# Patient Record
Sex: FEMALE | Race: BLACK OR AFRICAN AMERICAN | NOT HISPANIC OR LATINO | Employment: FULL TIME | URBAN - METROPOLITAN AREA
[De-identification: names, ages, dates, MRNs, and addresses within clinical notes are randomized per-mention and may not be internally consistent; named-entity substitution may affect disease eponyms.]

---

## 2017-12-01 ENCOUNTER — OFFICE VISIT (OUTPATIENT)
Dept: OBSTETRICS AND GYNECOLOGY | Facility: CLINIC | Age: 37
End: 2017-12-01
Payer: COMMERCIAL

## 2017-12-01 VITALS
HEIGHT: 59 IN | WEIGHT: 171.5 LBS | BODY MASS INDEX: 34.57 KG/M2 | DIASTOLIC BLOOD PRESSURE: 68 MMHG | SYSTOLIC BLOOD PRESSURE: 118 MMHG

## 2017-12-01 DIAGNOSIS — N89.8 VAGINAL DISCHARGE: ICD-10-CM

## 2017-12-01 DIAGNOSIS — Z01.419 ENCOUNTER FOR GYNECOLOGICAL EXAMINATION WITHOUT ABNORMAL FINDING: Primary | ICD-10-CM

## 2017-12-01 LAB
C TRACH DNA SPEC QL NAA+PROBE: NOT DETECTED
CANDIDA RRNA VAG QL PROBE: NEGATIVE
G VAGINALIS RRNA GENITAL QL PROBE: NEGATIVE
N GONORRHOEA DNA SPEC QL NAA+PROBE: NOT DETECTED
T VAGINALIS RRNA GENITAL QL PROBE: NEGATIVE

## 2017-12-01 PROCEDURE — 87480 CANDIDA DNA DIR PROBE: CPT

## 2017-12-01 PROCEDURE — 87660 TRICHOMONAS VAGIN DIR PROBE: CPT

## 2017-12-01 PROCEDURE — 87591 N.GONORRHOEAE DNA AMP PROB: CPT

## 2017-12-01 PROCEDURE — 99999 PR PBB SHADOW E&M-EST. PATIENT-LVL II: CPT | Mod: PBBFAC,,, | Performed by: OBSTETRICS & GYNECOLOGY

## 2017-12-01 PROCEDURE — 99395 PREV VISIT EST AGE 18-39: CPT | Mod: S$GLB,,, | Performed by: OBSTETRICS & GYNECOLOGY

## 2017-12-01 NOTE — PROGRESS NOTES
12/1/2017    Trichomonas vaginalis Negative   Gardnerella vaginalis Negative   Candida sp Negative   Chlamydia, Amplified DNA Not Detected   N gonorrhoeae, amplified DNA Not Detected       PT HERE FOR ANNUAL.  VAG D/C TX WITH MONASTAT LAST WEEK.  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    ROS:  GENERAL: No fever, chills, fatigability or weight loss.  VULVAR: No pain, no lesions and no itching.  VAGINAL: No relaxation, no itching, no discharge, no abnormal bleeding and no lesions.  ABDOMEN: No abdominal pain. Denies nausea. Denies vomiting. No diarrhea. No constipation  BREAST: Denies pain. No lumps. No discharge.  URINARY: No incontinence, no nocturia, no frequency and no dysuria.  CARDIOVASCULAR: No chest pain. No shortness of breath. No leg cramps.  NEUROLOGICAL: No headaches. No vision changes.  The remainder of the review of systems was negative.    PE:  General Appearance: overweight And Well developed. Well nourished. In no acute distress.  Vulva: Lesions: No.  Urethral Meatus: Normal size. Normal location. No lesions. No prolapse.  Urethra: No masses. No tenderness. No prolapse. No scarring.  Bladder: No masses. No tenderness.  Vagina: Mucosa NI:yes discharge no, atrophy no, cystocele no or rectocele no.  Cervix: Lesion: no  Stenotic: no Cervical motion tenderness: no  Uterus: Uterus size: 8 weeks. Support good. Uterus size: Normal  Adnexa: Masses: No Tenderness: No CDS Nodularity: No  Abdomen: overweight No masses. No tenderness.  Breasts: No bilateral masses. No bilateral discharge. No bilateral tenderness. No bilateral fibrocystic changes.  Neck: No thyroid enlargement. No thyroid masses.  Skin: Rashes: No      PROCEDURES:    PLAN:     DIAGNOSIS:  1. Encounter for gynecological examination without abnormal finding    2. Vaginal discharge        MEDICATIONS & ORDERS:  Orders Placed This Encounter    Vaginosis Screen by DNA Probe    C. trachomatis/N. gonorrhoeae by AMP DNA Cervix       Patient  was counseled today on the new ACS guidelines for cervical cytology screening as well as the current recommendations for breast cancer screening. She was counseled to follow up with her PCP for other routine health maintenance. Counseling session lasted approximately 10 minutes, and all her questions were answered.         FOLLOW-UP: With me in 12 month

## 2017-12-22 ENCOUNTER — OFFICE VISIT (OUTPATIENT)
Dept: FAMILY MEDICINE | Facility: CLINIC | Age: 37
End: 2017-12-22
Payer: COMMERCIAL

## 2017-12-22 VITALS
TEMPERATURE: 99 F | OXYGEN SATURATION: 98 % | SYSTOLIC BLOOD PRESSURE: 114 MMHG | BODY MASS INDEX: 34.22 KG/M2 | HEART RATE: 72 BPM | HEIGHT: 59 IN | WEIGHT: 169.75 LBS | DIASTOLIC BLOOD PRESSURE: 82 MMHG

## 2017-12-22 DIAGNOSIS — Z56.6 WORK-RELATED STRESS: Primary | ICD-10-CM

## 2017-12-22 DIAGNOSIS — G44.229 CHRONIC TENSION-TYPE HEADACHE, NOT INTRACTABLE: ICD-10-CM

## 2017-12-22 PROCEDURE — 99214 OFFICE O/P EST MOD 30 MIN: CPT | Mod: 25,S$GLB,, | Performed by: NURSE PRACTITIONER

## 2017-12-22 PROCEDURE — 96372 THER/PROPH/DIAG INJ SC/IM: CPT | Mod: S$GLB,,, | Performed by: FAMILY MEDICINE

## 2017-12-22 PROCEDURE — 99999 PR PBB SHADOW E&M-EST. PATIENT-LVL III: CPT | Mod: PBBFAC,,, | Performed by: NURSE PRACTITIONER

## 2017-12-22 RX ORDER — KETOROLAC TROMETHAMINE 30 MG/ML
60 INJECTION, SOLUTION INTRAMUSCULAR; INTRAVENOUS
Status: COMPLETED | OUTPATIENT
Start: 2017-12-22 | End: 2017-12-22

## 2017-12-22 RX ORDER — TRIAMCINOLONE ACETONIDE 40 MG/ML
40 INJECTION, SUSPENSION INTRA-ARTICULAR; INTRAMUSCULAR
Status: COMPLETED | OUTPATIENT
Start: 2017-12-22 | End: 2017-12-22

## 2017-12-22 RX ORDER — IBUPROFEN 800 MG/1
800 TABLET ORAL EVERY 6 HOURS PRN
Qty: 40 TABLET | Refills: 0 | Status: SHIPPED | OUTPATIENT
Start: 2017-12-22 | End: 2019-06-28 | Stop reason: ALTCHOICE

## 2017-12-22 RX ADMIN — TRIAMCINOLONE ACETONIDE 40 MG: 40 INJECTION, SUSPENSION INTRA-ARTICULAR; INTRAMUSCULAR at 09:12

## 2017-12-22 RX ADMIN — KETOROLAC TROMETHAMINE 60 MG: 30 INJECTION, SOLUTION INTRAMUSCULAR; INTRAVENOUS at 08:12

## 2017-12-22 SDOH — SOCIAL DETERMINANTS OF HEALTH (SDOH): OTHER PHYSICAL AND MENTAL STRAIN RELATED TO WORK: Z56.6

## 2017-12-22 NOTE — LETTER
December 22, 2017      Vicki Novoa   [address]  [place] [address]             [facility]  [address] [facility] [place] [address]  Phone: [phone]  Fax: [phone] Vicki Novoa    Was treated here on 12/22/2017. Please excuse Ms. Novoa from work on 12/21/2017 as well. May Return to work/school on 12/25/2017    No Restrictions        RADHA Yao

## 2017-12-22 NOTE — PATIENT INSTRUCTIONS
Tension Headache    A muscle tension headache is a very common cause of head pain. Its also called a stress headache. When some people are under stress, they tense the muscles of their shoulder, neck, and scalp without knowing it. If this tension lasts long enough, a headache can occur. A tension headache can be quite painful. It can last for hours or even days.  Home care  Follow these tips when caring for yourself at home:  · Dont drive yourself home if you were given pain medicine for your headache. Instead, have someone else drive you home. Try to sleep when you get home. You should feel much better when you wake up.  · Put heat on the back of your neck to help ease neck spasm.  · Drink only clear liquids or eat a light diet until your symptoms get better. This will help you avoid nausea or vomiting.  How to prevent headaches  · Figure out what is causing stress in your life. Learn new ways to handle your stress. Ideas include regular exercise, biofeedback, self-hypnosis, yoga, and meditation. Talk with your healthcare provider to find out more information about managing stress. Many books and digital media are also available on this subject.  · Take time out at the first sign of a tension headache, if possible. Take yourself out of the stressful situation. Find a quiet, comfortable place to sit or lie down and let yourself relax. Heat and deep massage of the tight areas in the neck and shoulders may help ease muscle spasm. You may also get relief from a medicine like ibuprofen or a prescribed muscle relaxant.  Follow-up care  Follow up with your healthcare provider, or as advised. Talk with your provider if you have frequent headaches. He or she can figure out a treatment plan. Ask if you can have medicine to take at home the next time you get a bad headache. This may keep you from having to visit the emergency department in the future. You may need to see a headache specialist (neurologist) if you continue  to have headaches.  When to seek medical advice  Call your healthcare provider right away if any of these occur:  · Your head pain gets worse during sexual intercourse or strenuous activity  · Your head pain doesnt get better within 24 hours  · You arent able to keep liquids down (repeated vomiting)  · Fever of 100.4ºF (38ºC) or higher, or as directed by your healthcare provider  · Stiff neck  · Extreme drowsiness, confusion, or fainting  · Dizziness or dizziness with spinning sensation (vertigo)  · Weakness in an arm or leg or one side of your face  · You have difficulty speaking  · Your vision changes  Date Last Reviewed: 8/1/2016 © 2000-2017 [employer]. [address] [place] [address], [place] [address]. All rights reserved. This information is not intended as a substitute for professional medical care. Always follow your healthcare professional's instructions.        Self-Care for Headaches  Most headaches aren't serious and can be relieved with self-care. But some headaches may be a sign of another health problem like eye trouble or high blood pressure. To find the best treatment, learn what kind of headaches you get. For tension headaches, self-care will usually help. To treat migraines, ask your healthcare provider for advice. It is also possible to get both tension and migraine headaches. Self-care involves relieving the pain and avoiding headache triggers if you can.    Ways to reduce pain and tension  Try these steps:  · Apply a cold compress or ice pack to the pain site.  · Drink fluids. If nausea makes it hard to drink, try sucking on ice.  · Rest. Protect yourself from bright light and loud noises.  · Calm your emotions by imagining a peaceful scene.  · Massage tight neck, shoulder, and head muscles.  · To relax muscles, soak in a hot bath or use a hot shower.  Use medicines  Aspirin or aspirin substitutes, such as ibuprofen and acetaminophen, can relieve headache. Remember: Never give  "aspirin to anyone 18 years old or younger because of the risk of developing Reye syndrome. Use pain medicines only when necessary.  Track your headaches  Keeping a headache diary can help you and your healthcare provider identify what's causing your headaches:  · Note when each headache happens.  · Identify your activities and the foods you've eaten 6 to 8 hours before the headache began.  · Look for any trends or "triggers."  Signs of tension headache  Any of the following can be signs:  · Dull pain or feeling of pressure in a tight band around your head  · Pain in your neck or shoulders  · Headache without a definite beginning or end  · Headache after an activity such as driving or working on a computer  Signs of migraine  Any of the following can be signs:  · Throbbing pain on one or both sides of your head  · Nausea or vomiting  · Extreme sensitivity to light, sound, and smells  · Bright spots, flashes, or other visual changes  · Pain or nausea so severe that you can't continue your daily activities  Call your healthcare provider   If you have any of the following symptoms, contact your healthcare provider:  · A headache that lingers after a recent injury or bump to the head.  · A fever with a stiff neck or pain when you bend your head toward your chest.  · A headache along with slurred speech, changes in your vision, or numbness or weakness in your arms or legs.  · A headache for longer than 3 days.  · Frequent headaches, especially in the morning.  · Headaches with seizures   · Seek immediate medical attention if you have a headache that you would call "the worst headache you have ever had."   Date Last Reviewed: 10/4/2015  © 6245-3343 [employer]. [address] [place] [address]. All rights reserved. This information is not intended as a substitute for professional medical care. Always follow your healthcare professional's instructions.        Manage Stress with a Healthy " Lifestyle  Managing stress is easier if you take good care of yourself. Make time for rest and recreation. Eat healthier meals. Take a walk now and then. And dont forget to treat yourself. A little down time can go a long way.    Get enough rest  When you dont get enough sleep, you may be too tired to cope with stress. Also, stress can prevent you from sleeping well or may keep you awake. If this happens to you, try reading or listening to soothing music before you go to sleep.  Make time for yourself  In todays world, there is often too much to do in too little time. It may seem hard to make time for yourself. But try to spend just a few minutes each day doing something you enjoy. This can improve the quality of your life and your mental outlook. Also, youll be more productive when handling your day-to-day duties. And youll be in a better frame of mind to cope with stress.  Eat right  Its easy to react to stress by reaching for a bag of chips or a cup of coffee. This may give you a quick boost but may later drain your energy. To keep your energy level steady, eat healthy meals and snacks at home and at work. Try not to skip meals. Stick with a low-fat diet thats rich in whole grains and fresh fruits and vegetables.  Nourish your spirit  When life is hectic, its easy to forget what your values and goals are. To help prevent this from happening, find out what is most important in your life. Ask yourself, What would I miss most if I had to start a new life alone somewhere else? My work? My family or friends? Something I love doing? Then focus on embracing your values and what you want to achieve in your life.  Stay on the move  Exercise helps burn off the negative energy of stress. Doing something active that you enjoy also helps you get away from stressful situations. Try to walk, jog, skate, swim, dance, take a fitness class, or play a team sport on most days. Or practice yoga or luanne chi, which can help  you relax.  Put some fun into your life  Some things you may enjoy doing may be listed below. If not, try some of these. Then add your own.  · Go see a movie  · Have lunch with a friend  · Learn a new sport or game  · Plan a fun trip  · Take a class on something you always wanted to learn  · Try a new hobby   Date Last Reviewed: 1/18/2017  © 2843-1921 [employer]. [address] [place] [address], [place] [address]. All rights reserved. This information is not intended as a substitute for professional medical care. Always follow your healthcare professional's instructions.        Stress Relief: A Positive Lifestyle  Learning to manage stress doesn't happen overnight. It's a process. The more you keep at it, the more you'll feel in control of daily events.     Leave plenty of time for family fun. Have a cookout or play games together. And try to share at least one meal each day.        Set limits  Trying to fit too much into a day is a major cause of stress. Setting limits will help you feel more in control. This sometimes means saying no--to people and even to things you might want to do. This can be hard at times. But knowing your priorities can help you make choices.  Know your priorities  Using your time and energy wisely is a good way to control stress. Save time for the things that matter most in your life. Ask yourself: Do I really need to do this? Do I want to do this? If you answer yes, then go ahead. But keep in mind you can also answer no.  Learn to accept support  Everybody needs support now and then. So don't feel embarrassed to ask for help when you need it. Most people are glad to lend a hand. And asking for help can open up new lines of communication and friendships.  Stress and children  Be careful not to take your stress out on your children. They may not understand why you're stressed. But they can sense your moods. Be aware that many children--especially teenagers--are under stress, too.  So plan time to talk with your kids. Ask them about school and any problems theyre having. Finally, make sure they have plenty of time to just be kids and have fun.  Be part of your community  Taking part in community or waylon-based events can offer a sense of belonging. It also helps put you in touch with active and caring people nearby. So whether its a clean-up day at a local park or taking meals to the elderly, try to reach out to friends and neighbors. Just remember: Taking time for yourself once in a while makes it easier to help others later on.   Date Last Reviewed: 1/1/2017  © 6425-4763 [employer]. [address] [place] [address], [place] [address]. All rights reserved. This information is not intended as a substitute for professional medical care. Always follow your healthcare professional's instructions.        Identifying Causes of Stress    Things that cause stress are called stressors. They can be everyday events, major life changes, or a combination of things. They can be either happy or sad events. Knowing your stressors will help you find ways to manage your stress.  Minor hassles  Daily life is filled with little annoyances. Spilled milk, lost keys, missed phone calls. These are rarely earth-shattering events. But the stress they cause can build up over time. Minor hassles also seem more painful if you're under long-term stress.  Major changes  A move, a divorce, or the loss of a loved one are major changes. They require you to adapt to a new lifestyle. You may fear an unknown future or worry about whether you'll be able to cope. Even positive events, like getting  or having a baby, can cause major stress.  Stress overload  Being pulled in many directions can be exhausting--especially when you're juggling work and family. Working late, taking kids to soccer, paying bills, and buying groceries may feel completely overwhelming. For a time, life can seem totally out of control.  Feeling  "helpless  Feeling helpless is a sign of long-term stress. You may feel like you have no control over your life. Even a faraway disaster told on the evening news may seem like part of your own troubles. Over time, these feelings may lead to depression. If you feel "down" for weeks, talk with your healthcare provider or a counselor. Depression can be treated.  Date Last Reviewed: 1/1/2017  © 4997-7374 [employer], [employer]. [address] [place] [address], [place] [address]. All rights reserved. This information is not intended as a substitute for professional medical care. Always follow your healthcare professional's instructions.        "

## 2017-12-22 NOTE — PROGRESS NOTES
Subjective:       Patient ID: Vicki Novoa is a 37 y.o. female.    Chief Complaint: Headache    37 you new to me presents for daily headaches. She is under work related stress. She will be reassigned to a new department following the holidays and hopefully this will help. She is also scheduled to see a psychiatrist at [facility] the week of Shannen.      Headache    This is a chronic problem. The current episode started more than 1 month ago (about 2-3 months). The problem occurs daily. The problem has been gradually worsening. The pain is located in the left unilateral and frontal region. The pain does not radiate. The pain quality is not similar to prior headaches. The quality of the pain is described as aching. Associated symptoms include photophobia. Pertinent negatives include no blurred vision, dizziness, eye pain, eye redness, loss of balance, scalp tenderness, seizures, tinnitus or weakness. The symptoms are aggravated by emotional stress and work. She has tried acetaminophen for the symptoms.       History reviewed. No pertinent past medical history.    Social History     Social History    Marital status: Single     Spouse name: N/A    Number of children: N/A    Years of education: N/A     Occupational History    Not on file.     Social History Main Topics    Smoking status: Never Smoker    Smokeless tobacco: Not on file    Alcohol use No    Drug use: No    Sexual activity: Yes     Partners: Male     Birth control/ protection: None, OCP     Other Topics Concern    Not on file     Social History Narrative    ,  .  Exercises.  Non smoker.          History reviewed. No pertinent surgical history.    Review of Systems   HENT: Negative for tinnitus.    Eyes: Positive for photophobia. Negative for blurred vision, pain, redness and visual disturbance.   Respiratory: Negative.    Cardiovascular: Negative.    Neurological: Positive for headaches. Negative for dizziness, seizures,  "syncope, facial asymmetry, weakness, light-headedness and loss of balance.   Psychiatric/Behavioral: Positive for behavioral problems (crying). Negative for agitation, decreased concentration, self-injury and suicidal ideas. The patient is not nervous/anxious.    All other systems reviewed and are negative.      Objective:   /82 (BP Location: Left arm, Patient Position: Sitting, BP Method: Small (Manual))   Pulse 72   Temp 98.5 °F (36.9 °C) (Oral)   Ht 4' 11" (1.499 m)   Wt 77 kg (169 lb 12.1 oz)   SpO2 98%   BMI 34.29 kg/m²      Physical Exam   Constitutional: She is oriented to person, place, and time. She appears well-developed and well-nourished. She is cooperative. No distress.   HENT:   Head: Normocephalic and atraumatic.   Eyes: EOM are normal. Pupils are equal, round, and reactive to light.   Cardiovascular: Normal rate, regular rhythm and normal heart sounds.    Pulmonary/Chest: Effort normal and breath sounds normal. No respiratory distress. She has no decreased breath sounds. She has no wheezes. She has no rhonchi. She has no rales.   Neurological: She is alert and oriented to person, place, and time. She has normal strength. No sensory deficit.   Skin: She is not diaphoretic. No pallor.   Psychiatric: Her speech is normal. She exhibits a depressed mood.   + crying on and off       Assessment:       1. Work-related stress    2. Chronic tension-type headache, not intractable        Plan:       Vicki was seen today for headache.    Diagnoses and all orders for this visit:    Work-related stress  She will be changing departments. This will hopefully help with stress. She also has an appt with psychiatry the week of nichole.    Chronic tension-type headache, not intractable  -     ketorolac injection 60 mg; Inject 2 mLs (60 mg total) into the muscle one time.  -     triamcinolone acetonide injection 40 mg; Inject 1 mL (40 mg total) into the muscle one time.  -     ibuprofen (ADVIL,MOTRIN) 800 " MG tablet; Take 1 tablet (800 mg total) by mouth every 6 (six) hours as needed for Pain.  · Figure out what is causing stress in your life. Learn new ways to handle your stress. Ideas include regular exercise, biofeedback, self-hypnosis, yoga, and meditation. Talk with your healthcare provider to find out more information about managing stress. Many books and digital media are also available on this subject.  Take time out at the first sign of a tension headache, if possible. Take yourself out of the stressful situation. Find a quiet, comfortable place to sit or lie down and let yourself relax. Heat and deep massage of the tight areas in the neck and shoulders may help ease muscle spasm. You may also get relief from a medicine like ibuprofen or a prescribed muscle relaxant.  Return if symptoms worsen or fail to improve.

## 2018-01-02 ENCOUNTER — TELEPHONE (OUTPATIENT)
Dept: FAMILY MEDICINE | Facility: CLINIC | Age: 38
End: 2018-01-02

## 2018-01-02 NOTE — TELEPHONE ENCOUNTER
Incoming call from patient to the clinic.  Call transferred by phone room staff.  Reports 10 lb piece of wood fell on head and left shoulder at approximately 2 pm today.  Denies LOC.  Reports left sided headed and bilateral shoulder pain.  Denies blurred vision or double vision.  Denies nausea or vomiting.  Speech clear.  Denies difficulty with coordination or balance.  Has taken one tablet of OTC Ibuprofen.  Patient does not know the dose of the medication.  Reports some resolution of headache after taking medication one hour ago.  Informed patient to seek emergent evaluation if symptoms or condition worsens or does not improve.  Requesting provider appointment.  Patient aware PCP has retired and will need to select a new PCP.  Appointment scheduled for tomorrow.

## 2018-01-03 ENCOUNTER — HOSPITAL ENCOUNTER (OUTPATIENT)
Dept: RADIOLOGY | Facility: HOSPITAL | Age: 38
Discharge: HOME | End: 2018-01-03
Attending: NURSE PRACTITIONER
Payer: COMMERCIAL

## 2018-01-03 ENCOUNTER — OFFICE VISIT (OUTPATIENT)
Dept: FAMILY MEDICINE | Facility: CLINIC | Age: 38
End: 2018-01-03
Payer: COMMERCIAL

## 2018-01-03 VITALS
OXYGEN SATURATION: 98 % | HEIGHT: 59 IN | BODY MASS INDEX: 34.33 KG/M2 | DIASTOLIC BLOOD PRESSURE: 64 MMHG | SYSTOLIC BLOOD PRESSURE: 110 MMHG | TEMPERATURE: 98 F | WEIGHT: 170.31 LBS | HEART RATE: 77 BPM
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
## 2018-01-03 DIAGNOSIS — S09.90XA INJURY OF HEAD, INITIAL ENCOUNTER: ICD-10-CM

## 2018-01-03 DIAGNOSIS — S16.1XXA STRAIN OF NECK MUSCLE, INITIAL ENCOUNTER: ICD-10-CM

## 2018-01-03 DIAGNOSIS — M25.512 ACUTE PAIN OF LEFT SHOULDER: ICD-10-CM
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
## 2018-01-03 PROCEDURE — 99214 OFFICE O/P EST MOD 30 MIN: CPT | Mod: S$GLB,,, | Performed by: NURSE PRACTITIONER

## 2018-01-03 PROCEDURE — 73030 X-RAY EXAM OF SHOULDER: CPT | Mod: 26,FY,LT, | Performed by: RADIOLOGY

## 2018-01-03 PROCEDURE — 99999 PR PBB SHADOW E&M-EST. PATIENT-LVL V: CPT | Mod: PBBFAC,,, | Performed by: NURSE PRACTITIONER

## 2018-01-03 PROCEDURE — 73030 X-RAY EXAM OF SHOULDER: CPT | Mod: TC,FY,PO,LT

## 2018-01-03 RX ORDER — CYCLOBENZAPRINE HCL 10 MG
10 TABLET ORAL 3 TIMES DAILY PRN
Qty: 30 TABLET | Refills: 0 | Status: SHIPPED | OUTPATIENT
Start: 2018-01-03 | End: 2018-01-13

## 2018-01-03 RX ORDER — ETODOLAC 400 MG/1
400 TABLET, FILM COATED ORAL 3 TIMES DAILY
Qty: 30 TABLET | Refills: 0 | Status: SHIPPED | OUTPATIENT
Start: 2018-01-03 | End: 2018-01-13

## 2018-01-03 RX ORDER — ESCITALOPRAM OXALATE 10 MG/1
TABLET ORAL
Refills: 0 | COMMUNITY
Start: 2017-12-26 | End: 2019-06-25

## 2018-01-03 NOTE — LETTER
January 3, 2018      Fabiankarlos Novoa   [address]  [place] [address]             [facility]  [address] [facility] [place] [address]  Phone: [phone]  Fax: [phone] Vicki Novoa    Was treated here on 01/03/2018    May Return to work/school on 01/08/2018    No Restrictions              Sugey Coffman NP

## 2018-01-03 NOTE — TELEPHONE ENCOUNTER
Called and spoken to patient. Per Sugey Coffman NP, patient's x-ray of shoulder came back normal. Patient also stated that she r/s her CT appt on tomorrow. Patient was advised.

## 2018-01-03 NOTE — TELEPHONE ENCOUNTER
----- Message from Ila Luz sent at 1/3/2018  2:23 PM CST -----  Contact: self  Patient is calling regarding having the CY scan done. Patient can be reached at [phone].        thanks

## 2018-01-03 NOTE — PATIENT INSTRUCTIONS
Understanding Cervical Strain    There are 7 bones (vertebrae) in the neck that are part of the spine. These are called the cervical spine. Cervical strain is a medical term for neck pain. The neck has several layers of muscles. These are connected with tendons to the cervical spine and other bones. Neck pain is often the result of injury to these muscles and tendons.  Causes of cervical strain  Different types of stress on the neck can damage muscles and tendons (soft tissues) and cause cervical strain. Cervical tissues can be damaged by:  · The neck being forced past its normal range of motion, such as in a car accident or sports injury  · Constant, low-level stress, such as from poor posture or a poorly set-up workspace  Symptoms of cervical strain  These may include:  · Neck pain or stiffness  · Pain in the shoulders or upper back  · Muscle spasms  · Headache, often starting at the base of the neck  · Irritability, difficulty concentrating, or sleeplessness  Treatment for cervical strain  This problem often gets better on its own. Treatments aim to reduce pain and inflammation and increase the range of motion of the neck. Possible treatments include:  · Over-the-counter or prescription pain medicine. These help relieve pain and inflammation.  · Stretching exercises to decrease neck stiffness.  · Massage to decrease neck stiffness.  · Cold or heat pack. These help reduce pain and swelling.  Call 911  Call emergency services right away if you have any of these:  · Face drooping or numbness  · Numbness or weakness, especially in the arms or on one side  · Slurred speech or difficulty speaking  · Blurred vision   When to call your healthcare provider  Call your healthcare provider right away if you have any of these:  · Fever of 100.4°F (38°C) or higher, or as directed  · Pain or stiffness that gets worse  · Symptoms that dont get better, or get worse  · Numbness, tingling, weakness or shooting pains into the  arms or legs  · New symptoms  Date Last Reviewed: 3/10/2016  © 0070-9271 [employer]. [address] [place] [address], [place] [address]. All rights reserved. This information is not intended as a substitute for professional medical care. Always follow your healthcare professional's instructions.        After a Concussion     Awaken to check alertness as often as the health care provider suggests.     If you or someone close to you has had a mild concussion (a head injury), watch closely for signs of problems during the first 48 hours after the injury. Follow the doctors advice about recovering at home. Use the tips on this handout as a guide.  Call 911 or your emergency number if the person with the concussion will not fully wake up or has seizures or convulsions.   The first 48 hours  Dont take medicine unless approved by your healthcare provider. Try placing a cold, damp cloth on the head to help relieve a headache.  · Ask the doctor before using any medicines.  · Don't drink alcohol or take sedatives or medicines that make you sleepy.  · Don't return to sports or any activity that could cause you to hit your head until all symptoms are gone and you have been cleared by your doctor. A second head injury before fully recovering from the first one can lead to serious brain injury.  · Avoid doing activities that require a lot of concentration or a lot of attention. This will allow your brain to rest and heal more quickly.  · Return to regular physical and mental activity as directed and approved by your healthcare provider.  Tips about sleeping  For the first day or two, it may be best not to sleep for long periods of time without being checked for alertness. Follow the doctors instructions.  ? Wake every ____ hours for the next ____ hours. Ask questions to check for alertness.  ? OK to sleep through the night.  Note: A person should not be left alone after a concussion. If no adult can stay with the injured  person, let the doctor know.  When to call the doctor  If you notice any of the following, call the doctor or healthcare provider:  · Vomiting (some vomiting is common, but tell the doctor about any vomiting)  · Clear or bloody drainage from the nose or ear  · Constant drowsiness or difficulty in waking up  · Confusion or memory loss  · Blurred vision or any vision changes  · Inability to walk or talk normally  · Increased weakness or problems with coordination  · Constant, unrelieved headache that becomes more severe  · Changes in behavior or personality  · High-pitched crying in infants   Date Last Reviewed: 8/17/2015  © 9122-9436 [employer]. [address] [place] [address], [place] [address]. All rights reserved. This information is not intended as a substitute for professional medical care. Always follow your healthcare professional's instructions.

## 2018-01-03 NOTE — PROGRESS NOTES
"History of Present Illness   Vicki Novoa is a 37 y.o. woman who denies significant past medical history who presents today for evaluation after fall/head injury that occurred yesterday. She was getting firewood down from the attic and a piece fell and struck her on the left side of her head/neck. She did fall, and she is unsure of LOC. States she did feel "fuzzy" for a few seconds after injury but remembers the incident. Since the fall, she has had worsening left shoulder and left neck pain. Pain is worse with movement of left arm. She does have some associated tingling and decreased strength in left arm. She has had intermittent cervical paraspinal muscle spasm. She denies nausea, vomiting, confusion, slurred speech, blurred vision, or other symptoms since injury. She has tried 800mg Ibuprofen with no relief. She was instructed to be evaluated in ED yesterday but preferred an appointment today. She has no additional complaints and is otherwise healthy on today's visit.    History reviewed. No pertinent past medical history.    History reviewed. No pertinent surgical history.    Social History     Social History    Marital status: Single     Spouse name: N/A    Number of children: N/A    Years of education: N/A     Social History Main Topics    Smoking status: Never Smoker    Smokeless tobacco: None    Alcohol use No    Drug use: No    Sexual activity: Yes     Partners: Male     Birth control/ protection: None, OCP     Other Topics Concern    None     Social History Narrative    ,  .  Exercises.  Non smoker.          Family History   Problem Relation Age of Onset    Diabetes Father     Diabetes Maternal Grandmother     Breast cancer Neg Hx     Colon cancer Neg Hx     Ovarian cancer Neg Hx     Heart disease Neg Hx        Review of Systems  Review of Systems   Eyes: Negative for blurred vision.   Gastrointestinal: Negative for nausea and vomiting.   Musculoskeletal: Positive for falls, " "joint pain and neck pain. Negative for back pain.   Skin:        Negative for bruising or abrasions   Neurological: Positive for tingling, loss of consciousness (possible) and headaches. Negative for dizziness, speech change and focal weakness.     A complete review of systems was otherwise negative.    Physical Exam  /64 (BP Location: Right arm, Patient Position: Sitting, BP Method: Medium (Manual))   Pulse 77   Temp 98.2 °F (36.8 °C) (Oral)   Ht 4' 11" (1.499 m)   Wt 77.3 kg (170 lb 4.9 oz)   LMP 12/19/2017   SpO2 98%   BMI 34.40 kg/m²   General appearance: alert, appears stated age, cooperative and no distress  Head: small hematoma noted to left temporal region  Eyes: negative findings: pupils equal, round, reactive to light and accomodation  Neck: supple, symmetrical, trachea midline and left cervical paraspinal TTP, no bony tenderness  Back: symmetric, no curvature. ROM normal. No CVA tenderness.  Lungs: clear to auscultation bilaterally  Heart: regular rate and rhythm, S1, S2 normal, no murmur, click, rub or gallop  Extremities: extremities normal, atraumatic, no cyanosis or edema  Pulses: 2+ and symmetric  Skin: Skin color, texture, turgor normal. No rashes or lesions  Neurologic: Mental status: Alert, oriented, thought content appropriate  Sensory: normal  Motor:decreased LUE  normal RUE  normal LLE  normal RLE  Musculoskeletal: She exhibits TTP of left AC joint. There is no obvious deformity or joint crepitus. She has pain with ROM, elevation of left arm >30 degrees.    Assessment/Plan  Fall, initial encounter  X-ray of shoulder WNL no dislocation or fracture.  Given possible LOC, will obtain CT of head.  Flexeril for muscle spasm with Lodine for pain management.  Sling provided for left arm comfort.  Rest, heat to area, and avoid heavy lifting/strenuous activity.  ER precautions discussed at length with patient.  RTC PRN for persistent or worsening symptoms.  -     Cancel: X-Ray Shoulder " Trauma 3 view Left; Future; Expected date: 01/03/2018  -     cyclobenzaprine (FLEXERIL) 10 MG tablet; Take 1 tablet (10 mg total) by mouth 3 (three) times daily as needed for Muscle spasms.  Dispense: 30 tablet; Refill: 0  -     etodolac (LODINE) 400 MG tablet; Take 1 tablet (400 mg total) by mouth 3 (three) times daily.  Dispense: 30 tablet; Refill: 0  -     CT Head Without Contrast; Future; Expected date: 01/03/2018    Injury of head, initial encounter  As above.  -     Cancel: X-Ray Shoulder Trauma 3 view Left; Future; Expected date: 01/03/2018  -     Cancel: CT Head Without Contrast; Future; Expected date: 01/03/2018  -     cyclobenzaprine (FLEXERIL) 10 MG tablet; Take 1 tablet (10 mg total) by mouth 3 (three) times daily as needed for Muscle spasms.  Dispense: 30 tablet; Refill: 0  -     etodolac (LODINE) 400 MG tablet; Take 1 tablet (400 mg total) by mouth 3 (three) times daily.  Dispense: 30 tablet; Refill: 0  -     CT Head Without Contrast; Future; Expected date: 01/03/2018    Acute pain of left shoulder  As above.  -     Cancel: X-Ray Shoulder Trauma 3 view Left; Future; Expected date: 01/03/2018  -     Cancel: CT Head Without Contrast; Future; Expected date: 01/03/2018  -     cyclobenzaprine (FLEXERIL) 10 MG tablet; Take 1 tablet (10 mg total) by mouth 3 (three) times daily as needed for Muscle spasms.  Dispense: 30 tablet; Refill: 0  -     etodolac (LODINE) 400 MG tablet; Take 1 tablet (400 mg total) by mouth 3 (three) times daily.  Dispense: 30 tablet; Refill: 0    Strain of neck muscle, initial encounter  As above.  -     Cancel: X-Ray Shoulder Trauma 3 view Left; Future; Expected date: 01/03/2018  -     Cancel: CT Head Without Contrast; Future; Expected date: 01/03/2018  -     cyclobenzaprine (FLEXERIL) 10 MG tablet; Take 1 tablet (10 mg total) by mouth 3 (three) times daily as needed for Muscle spasms.  Dispense: 30 tablet; Refill: 0  -     etodolac (LODINE) 400 MG tablet; Take 1 tablet (400 mg total)  by mouth 3 (three) times daily.  Dispense: 30 tablet; Refill: 0  -     CT Head Without Contrast; Future; Expected date: 01/03/2018    She has verbalized understanding and is in agreement with plan of care.    Return if symptoms worsen or fail to improve.

## 2018-01-04 ENCOUNTER — HOSPITAL ENCOUNTER (OUTPATIENT)
Dept: RADIOLOGY | Facility: HOSPITAL | Age: 38
Discharge: HOME | End: 2018-01-04
Attending: NURSE PRACTITIONER
Payer: COMMERCIAL

## 2018-01-04 ENCOUNTER — TELEPHONE (OUTPATIENT)
Dept: FAMILY MEDICINE | Facility: CLINIC | Age: 38
End: 2018-01-04
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
## 2018-01-04 DIAGNOSIS — S09.90XA INJURY OF HEAD, INITIAL ENCOUNTER: ICD-10-CM

## 2018-01-04 DIAGNOSIS — S16.1XXA STRAIN OF NECK MUSCLE, INITIAL ENCOUNTER: ICD-10-CM

## 2018-01-04 DIAGNOSIS — S09.90XA INJURY OF HEAD, INITIAL ENCOUNTER: Primary | ICD-10-CM

## 2018-01-04 PROCEDURE — 70450 CT HEAD/BRAIN W/O DYE: CPT | Mod: TC

## 2018-01-04 PROCEDURE — 70450 CT HEAD/BRAIN W/O DYE: CPT | Mod: 26,,, | Performed by: RADIOLOGY

## 2018-01-08 ENCOUNTER — TELEPHONE (OUTPATIENT)
Dept: FAMILY MEDICINE | Facility: CLINIC | Age: 38
End: 2018-01-08

## 2018-01-08 NOTE — TELEPHONE ENCOUNTER
Called and spoken to patient. Patient was advised of message. Patient stated that she is still having left side weakness and muscle spasms. Patient does have an appt with Orthopedic on this Friday. Patient would like to know if Sugey can extend her work note til Friday?
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
## 2018-01-08 NOTE — LETTER
January 8, 2018      Vicki Novoa   [address]  [place] [address]             [facility]  [address] [place] [address]  Phone: [phone]  Fax: [phone] Vicki Novoa    Was treated here on 01/03/2018    May Return to work/school on 01/12/2018    No Restrictions              Sugey Coffman NP

## 2018-01-08 NOTE — TELEPHONE ENCOUNTER
The patient's CT scan showed no abnormalities. What symptoms is she still having? If her pain is severe, she may need to be evaluated at urgent care or ER.    Thanks!  WILFREDO Mendez

## 2018-01-20 PROBLEM — M75.51 BILATERAL SHOULDER BURSITIS: Status: ACTIVE | Noted: 2018-01-20

## 2018-01-20 PROBLEM — M47.812 CERVICAL SPONDYLOSIS: Status: ACTIVE | Noted: 2018-01-20

## 2018-01-20 PROBLEM — S29.019A STRAIN OF THORACIC REGION: Status: ACTIVE | Noted: 2018-01-20

## 2018-01-20 PROBLEM — M75.52 BILATERAL SHOULDER BURSITIS: Status: ACTIVE | Noted: 2018-01-20

## 2018-01-20 PROBLEM — S40.012A CONTUSION OF LEFT SHOULDER: Status: ACTIVE | Noted: 2018-01-20

## 2018-01-20 PROBLEM — M50.30 DDD (DEGENERATIVE DISC DISEASE), CERVICAL: Status: ACTIVE | Noted: 2018-01-20

## 2018-01-20 PROBLEM — G56.02 LEFT CARPAL TUNNEL SYNDROME: Status: ACTIVE | Noted: 2018-01-20

## 2018-02-17 PROBLEM — M50.20 HERNIATED CERVICAL DISC: Status: ACTIVE | Noted: 2018-02-17

## 2018-02-17 PROBLEM — M51.26 LUMBAR HERNIATED DISC: Status: ACTIVE | Noted: 2018-02-17

## 2018-02-17 PROBLEM — M48.02 CERVICAL STENOSIS OF SPINAL CANAL: Status: ACTIVE | Noted: 2018-02-17

## 2018-02-17 PROBLEM — M54.12 CERVICAL RADICULOPATHY: Status: ACTIVE | Noted: 2018-02-17

## 2018-02-17 PROBLEM — M48.061 LUMBAR SPINAL STENOSIS: Status: ACTIVE | Noted: 2018-02-17

## 2018-04-17 ENCOUNTER — OFFICE VISIT (OUTPATIENT)
Dept: OBSTETRICS AND GYNECOLOGY | Facility: CLINIC | Age: 38
End: 2018-04-17
Payer: COMMERCIAL

## 2018-04-17 VITALS
DIASTOLIC BLOOD PRESSURE: 64 MMHG | SYSTOLIC BLOOD PRESSURE: 100 MMHG | HEIGHT: 64 IN | WEIGHT: 165.38 LBS | BODY MASS INDEX: 28.24 KG/M2

## 2018-04-17 DIAGNOSIS — N76.0 BACTERIAL VAGINITIS: Primary | ICD-10-CM

## 2018-04-17 DIAGNOSIS — B96.89 BACTERIAL VAGINITIS: Primary | ICD-10-CM

## 2018-04-17 LAB
CANDIDA RRNA VAG QL PROBE: NEGATIVE
G VAGINALIS RRNA GENITAL QL PROBE: NEGATIVE
T VAGINALIS RRNA GENITAL QL PROBE: NEGATIVE

## 2018-04-17 PROCEDURE — 99213 OFFICE O/P EST LOW 20 MIN: CPT | Mod: S$GLB,,, | Performed by: OBSTETRICS & GYNECOLOGY

## 2018-04-17 PROCEDURE — 99999 PR PBB SHADOW E&M-EST. PATIENT-LVL II: CPT | Mod: PBBFAC,,, | Performed by: OBSTETRICS & GYNECOLOGY

## 2018-04-17 PROCEDURE — 87510 GARDNER VAG DNA DIR PROBE: CPT

## 2018-04-17 PROCEDURE — 87480 CANDIDA DNA DIR PROBE: CPT

## 2018-04-17 RX ORDER — TINIDAZOLE 500 MG/1
TABLET ORAL
Qty: 8 TABLET | Refills: 1 | Status: SHIPPED | OUTPATIENT
Start: 2018-04-17

## 2018-04-17 NOTE — PROGRESS NOTES
4/17/2018    Trichomonas vaginalis Negative   Gardnerella vaginalis Negative   Candida sp Negative       PT HERE FOR VAGINAL FISH ODOR FOR 1 WEEK. NO REAL D/C. NOT DISCOLORED.  HAS TRIED NO MEDS.    ROS:  GENERAL: No fever, chills, fatigability or weight loss.  VULVAR: No pain, no lesions and no itching.  VAGINAL: No relaxation, no itching, no discharge, no abnormal bleeding and no lesions.  ABDOMEN: No abdominal pain. Denies nausea. Denies vomiting. No diarrhea. No constipation  BREAST: Denies pain. No lumps. No discharge.  URINARY: No incontinence, no nocturia, no frequency and no dysuria.  CARDIOVASCULAR: No chest pain. No shortness of breath. No leg cramps.  NEUROLOGICAL: No headaches. No vision changes.  The remainder of the review of systems was negative.    PE:  General Appearance: overweight And Well developed. Well nourished. In no acute distress.  Vulva: Lesions: No.  Urethral Meatus: Normal size. Normal location. No lesions. No prolapse.  Urethra: No masses. No tenderness. No prolapse. No scarring.  Bladder: No masses. No tenderness.  Vagina: Mucosa NI:yes discharge yes  GREY, atrophy no, cystocele no or rectocele no.  Cervix: Lesion: no  Stenotic: no Cervical motion tenderness: no      PROCEDURES:    PLAN:     DIAGNOSIS:  1. Bacterial vaginitis        MEDICATIONS & ORDERS:  Orders Placed This Encounter    Vaginosis Screen by DNA Probe    tinidazole (TINDAMAX) 500 MG tablet     FOLLOW-UP: With me in PRN month

## 2019-06-25 ENCOUNTER — OFFICE VISIT (OUTPATIENT)
Dept: URGENT CARE | Facility: CLINIC | Age: 39
End: 2019-06-25
Payer: OTHER MISCELLANEOUS

## 2019-06-25 VITALS
RESPIRATION RATE: 16 BRPM | HEIGHT: 64 IN | DIASTOLIC BLOOD PRESSURE: 65 MMHG | WEIGHT: 165 LBS | OXYGEN SATURATION: 99 % | SYSTOLIC BLOOD PRESSURE: 113 MMHG | TEMPERATURE: 98 F | HEART RATE: 73 BPM | BODY MASS INDEX: 28.17 KG/M2
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
## 2019-06-25 DIAGNOSIS — R25.1 OCCASIONAL TREMORS: ICD-10-CM

## 2019-06-25 DIAGNOSIS — F43.0 ANXIETY IN ACUTE STRESS REACTION: Primary | ICD-10-CM

## 2019-06-25 DIAGNOSIS — R07.9 CHEST PAIN, UNSPECIFIED TYPE: ICD-10-CM

## 2019-06-25 DIAGNOSIS — F41.1 ANXIETY IN ACUTE STRESS REACTION: Primary | ICD-10-CM

## 2019-06-25 PROCEDURE — 93005 EKG 12-LEAD: ICD-10-PCS | Mod: S$GLB,,, | Performed by: PHYSICIAN ASSISTANT

## 2019-06-25 PROCEDURE — 99203 OFFICE O/P NEW LOW 30 MIN: CPT | Mod: S$GLB,,, | Performed by: PHYSICIAN ASSISTANT

## 2019-06-25 PROCEDURE — 93010 EKG 12-LEAD: ICD-10-PCS | Mod: S$GLB,,, | Performed by: INTERNAL MEDICINE

## 2019-06-25 PROCEDURE — 99203 PR OFFICE/OUTPT VISIT, NEW, LEVL III, 30-44 MIN: ICD-10-PCS | Mod: S$GLB,,, | Performed by: PHYSICIAN ASSISTANT

## 2019-06-25 PROCEDURE — 93005 ELECTROCARDIOGRAM TRACING: CPT | Mod: S$GLB,,, | Performed by: PHYSICIAN ASSISTANT

## 2019-06-25 PROCEDURE — 93010 ELECTROCARDIOGRAM REPORT: CPT | Mod: S$GLB,,, | Performed by: INTERNAL MEDICINE

## 2019-06-25 RX ORDER — AMITRIPTYLINE HYDROCHLORIDE 25 MG/1
25 TABLET, FILM COATED ORAL NIGHTLY
Qty: 30 TABLET | Refills: 0 | Status: SHIPPED | OUTPATIENT
Start: 2019-06-25 | End: 2019-06-28 | Stop reason: ALTCHOICE

## 2019-06-25 NOTE — LETTER
[facility] [address] [address]  Phone: [phone]  Fax: [phone]  [facility]: [id number]    Pt Name: Vicki Novoa  Injury Date: 06/24/2019   Employee ID:  Date of First Treatment: 06/25/2019   Company: [employer] reference to record [employer] [id number][[employer]      Appointment Time: 11:40 AM Arrived: 11:55 am    Provider: Santos Grullon PA-C Time Out:1:13 pm      Office Treatment:   1. Anxiety in acute stress reaction    2. Chest pain, unspecified type    3. Occasional tremors    4. Work related injury               Restrictions: Disabled until next office visit     Return Appointment: 6/28/2019 at 9:00 am
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
## 2019-06-28 ENCOUNTER — OFFICE VISIT (OUTPATIENT)
Dept: URGENT CARE | Facility: CLINIC | Age: 39
End: 2019-06-28
Payer: OTHER MISCELLANEOUS

## 2019-06-28 VITALS
HEART RATE: 69 BPM | HEIGHT: 64 IN | RESPIRATION RATE: 20 BRPM | OXYGEN SATURATION: 98 % | WEIGHT: 165 LBS | SYSTOLIC BLOOD PRESSURE: 128 MMHG | DIASTOLIC BLOOD PRESSURE: 76 MMHG | BODY MASS INDEX: 28.17 KG/M2 | TEMPERATURE: 98 F

## 2019-06-28 DIAGNOSIS — R07.9 CHEST PAIN, UNSPECIFIED TYPE: ICD-10-CM

## 2019-06-28 DIAGNOSIS — F43.0 ANXIETY IN ACUTE STRESS REACTION: Primary | ICD-10-CM

## 2019-06-28 DIAGNOSIS — F41.1 ANXIETY IN ACUTE STRESS REACTION: Primary | ICD-10-CM

## 2019-06-28 DIAGNOSIS — R10.9 ABDOMINAL PAIN, UNSPECIFIED ABDOMINAL LOCATION: ICD-10-CM

## 2019-06-28 DIAGNOSIS — R25.1 OCCASIONAL TREMORS: ICD-10-CM

## 2019-06-28 DIAGNOSIS — F32.A DEPRESSION, UNSPECIFIED DEPRESSION TYPE: ICD-10-CM

## 2019-06-28 PROCEDURE — 99214 PR OFFICE/OUTPT VISIT, EST, LEVL IV, 30-39 MIN: ICD-10-PCS | Mod: S$GLB,,, | Performed by: PHYSICIAN ASSISTANT

## 2019-06-28 PROCEDURE — 99214 OFFICE O/P EST MOD 30 MIN: CPT | Mod: S$GLB,,, | Performed by: PHYSICIAN ASSISTANT

## 2019-06-28 RX ORDER — DICYCLOMINE HYDROCHLORIDE 20 MG/1
20 TABLET ORAL 4 TIMES DAILY PRN
Qty: 30 TABLET | Refills: 0 | Status: SHIPPED | OUTPATIENT
Start: 2019-06-28

## 2019-06-28 RX ORDER — IBUPROFEN 600 MG/1
600 TABLET ORAL EVERY 6 HOURS PRN
Qty: 30 TABLET | Refills: 0 | Status: SHIPPED | OUTPATIENT
Start: 2019-06-28 | End: 2019-07-11 | Stop reason: SDUPTHER

## 2019-06-28 RX ORDER — ESCITALOPRAM OXALATE 20 MG/1
TABLET ORAL
Refills: 0 | COMMUNITY
Start: 2019-06-25

## 2019-06-28 RX ORDER — QUETIAPINE FUMARATE 25 MG/1
TABLET, FILM COATED ORAL
Refills: 0 | COMMUNITY
Start: 2019-06-25

## 2019-06-28 NOTE — PROGRESS NOTES
Subjective:       Patient ID: Vicki Novoa is a 38 y.o. female.    Chief Complaint: Follow-up    Pt is following up on chest pain going across not in any specific and abdominal pain on the lower right due to stress at work. She has started seeing a physiatrist, Dr. Pennington at [facility] and ws prescribed lexapro and Seroquel.  He Stopped her Elavil.  She hasn't taken anything for the chest pain and trimmers.  Patient states she has had much of an appetite lately.  She states she is fearful of her boss.    Chest Pain    This is a new problem. Episode onset: 4 days ago. The onset quality is sudden. The problem occurs constantly. The problem has been unchanged. The quality of the pain is described as tightness. Associated symptoms include abdominal pain. Pertinent negatives include no back pain, fever, headaches, nausea, shortness of breath or vomiting. The pain is aggravated by emotional upset.     Review of Systems   Constitution: Negative for chills and fever.   HENT: Negative for sore throat.    Eyes: Negative for blurred vision.   Cardiovascular: Positive for chest pain.   Respiratory: Negative for shortness of breath.    Skin: Negative for rash.   Musculoskeletal: Negative for back pain and joint pain.   Gastrointestinal: Positive for abdominal pain. Negative for diarrhea, nausea and vomiting.   Neurological: Negative for headaches.   Psychiatric/Behavioral: Positive for depression. The patient has insomnia and is nervous/anxious.        Objective:      Physical Exam   Constitutional: She is oriented to person, place, and time. She appears well-developed and well-nourished. She is cooperative.  Non-toxic appearance. She does not appear ill. No distress.   HENT:   Head: Normocephalic and atraumatic.   Right Ear: Hearing, tympanic membrane, external ear and ear canal normal.   Left Ear: Hearing, tympanic membrane, external ear and ear canal normal.   Nose: Nose normal. No mucosal edema, rhinorrhea or nasal  deformity. No epistaxis. Right sinus exhibits no maxillary sinus tenderness and no frontal sinus tenderness. Left sinus exhibits no maxillary sinus tenderness and no frontal sinus tenderness.   Mouth/Throat: Uvula is midline, oropharynx is clear and moist and mucous membranes are normal. No trismus in the jaw. Normal dentition. No uvula swelling. No posterior oropharyngeal erythema.   Eyes: Conjunctivae and lids are normal. Right eye exhibits no discharge. Left eye exhibits no discharge. No scleral icterus.   Sclera clear bilat   Neck: Trachea normal, normal range of motion, full passive range of motion without pain and phonation normal. Neck supple.   Cardiovascular: Normal rate, regular rhythm, normal heart sounds, intact distal pulses and normal pulses.   Pulmonary/Chest: Effort normal and breath sounds normal. No respiratory distress.   Abdominal: Soft. Normal appearance and bowel sounds are normal. She exhibits no distension, no pulsatile midline mass and no mass. There is generalized tenderness. There is no rigidity, no rebound, no guarding and no CVA tenderness.   Generalized tenderness right greater than left, no peritoneal signs   Musculoskeletal: Normal range of motion. She exhibits no edema or deformity.   Neurological: She is alert and oriented to person, place, and time. She exhibits normal muscle tone. Coordination normal.   Skin: Skin is warm, dry and intact. She is not diaphoretic. No pallor.   Psychiatric: Her speech is normal and behavior is normal. Judgment and thought content normal. Cognition and memory are normal. She exhibits a depressed mood (Patient cried a few times during office visit). She is attentive.   Nursing note and vitals reviewed.      Assessment:       1. Anxiety in acute stress reaction    2. Occasional tremors    3. Chest pain, unspecified type    4. Abdominal pain, unspecified abdominal location    5. Depression, unspecified depression type        Plan:         Medications  Ordered This Encounter   Medications    dicyclomine (BENTYL) 20 mg tablet     Sig: Take 1 tablet (20 mg total) by mouth 4 (four) times daily as needed (abdominal pain).     Dispense:  30 tablet     Refill:  0    ibuprofen (ADVIL,MOTRIN) 600 MG tablet     Sig: Take 1 tablet (600 mg total) by mouth every 6 (six) hours as needed for Pain. Take with meals.     Dispense:  30 tablet     Refill:  0     Patient Instructions: (Follow-up with Psychiatry as scheduled )   Restrictions: Disabled until next office visit  Follow up in about 4 days (around 7/2/2019) for Dr. Farnsworth.

## 2019-06-28 NOTE — LETTER
[facility] [address] [address]  Phone: [phone]  Fax: [phone]  [facility]: [id number]    Pt Name: Vicki Novoa  Injury Date: 06/24/2019   Employee ID:  Date of First Treatment: 06/28/2019   Company: [employer] to [employer]      Appointment Time: 08:45 AM Arrived: 08:53 AM   Provider: Santos Grullon PA-C Time Out: 10:05 AM     Office Treatment:   1. Anxiety in acute stress reaction    2. Occasional tremors    3. Chest pain, unspecified type    4. Abdominal pain, unspecified abdominal location    5. Depression, unspecified depression type      Medications Ordered This Encounter   Medications    dicyclomine (BENTYL) 20 mg tablet    ibuprofen (ADVIL,MOTRIN) 600 MG tablet      Patient Instructions: (Follow-up with Psychiatry as scheduled )    Restrictions: Disabled until next office visit     Return Appointment: Tuesday July 2, 2019 at 1:00 PM  Report to [facility]  [address] [place] [address] [place].   AMANDA Parks [id number]

## 2019-07-02 ENCOUNTER — OFFICE VISIT (OUTPATIENT)
Dept: URGENT CARE | Facility: CLINIC | Age: 39
End: 2019-07-02
Payer: OTHER MISCELLANEOUS

## 2019-07-02 DIAGNOSIS — F32.A DEPRESSION, UNSPECIFIED DEPRESSION TYPE: ICD-10-CM

## 2019-07-02 DIAGNOSIS — R07.9 CHEST PAIN, UNSPECIFIED TYPE: ICD-10-CM

## 2019-07-02 DIAGNOSIS — R25.1 OCCASIONAL TREMORS: ICD-10-CM

## 2019-07-02 DIAGNOSIS — F43.0 ANXIETY IN ACUTE STRESS REACTION: Primary | ICD-10-CM

## 2019-07-02 DIAGNOSIS — R10.9 ABDOMINAL PAIN, UNSPECIFIED ABDOMINAL LOCATION: ICD-10-CM

## 2019-07-02 DIAGNOSIS — F41.1 ANXIETY IN ACUTE STRESS REACTION: Primary | ICD-10-CM

## 2019-07-02 PROCEDURE — 99214 OFFICE O/P EST MOD 30 MIN: CPT | Mod: S$GLB,,, | Performed by: PREVENTIVE MEDICINE

## 2019-07-02 PROCEDURE — 99214 PR OFFICE/OUTPT VISIT, EST, LEVL IV, 30-39 MIN: ICD-10-PCS | Mod: S$GLB,,, | Performed by: PREVENTIVE MEDICINE

## 2019-07-02 NOTE — LETTER
[facility] [place]  [address]  [place] [address]  Phone: [phone]  Fax: [phone]  [facility]: [id number]    Pt Name: Vicki Novoa  Injury Date: 06/24/2019   Employee ID:  Date of Treatment: 07/02/2019   Company: [employer]      Appointment Time: 12:45 PM Arrived: 1300 PM    Provider: Javier Farnsworth MD Time Out: 1420 PM      Office Treatment:   EXAM  DISABLED UNTIL NEXT OFFICE VISIT    1. Anxiety in acute stress reaction    2. Occasional tremors    3. Chest pain, unspecified type    4. Abdominal pain, unspecified abdominal location    5. Depression, unspecified depression type               Restrictions: Disabled until next office visit     Return Appointment: 7/9/2019 at 1530 PM

## 2019-07-02 NOTE — PROGRESS NOTES
Subjective:       Patient ID: Vicki Novoa is a 38 y.o. female.    Chief Complaint: Anxiety    Pt is following up on chest pain going across not in any specific and abdominal pain on the lower right due to stress at work. She has started seeing a physiatrist, Dr. Pennington at [facility] and ws prescribed lexapro and Seroquel.  He Stopped her Elavil.  She hasn't taken anything for the chest pain and trimmers.  Patient states she has had much of an appetite lately.  She states she is fearful of her boss.    Anxiety   Symptoms include chest pain, insomnia and nervous/anxious behavior. Patient reports no nausea or shortness of breath.       Chest Pain    This is a new problem. Episode onset: 4 days ago. The onset quality is sudden. The problem occurs constantly. The problem has been unchanged. The quality of the pain is described as tightness. Associated symptoms include abdominal pain. Pertinent negatives include no back pain, fever, headaches, nausea, shortness of breath or vomiting. The pain is aggravated by emotional upset.     Review of Systems   Constitution: Negative for chills and fever.   HENT: Negative for sore throat.    Eyes: Negative for blurred vision.   Cardiovascular: Positive for chest pain.   Respiratory: Negative for shortness of breath.    Skin: Negative for rash.   Musculoskeletal: Negative for back pain and joint pain.   Gastrointestinal: Positive for abdominal pain. Negative for diarrhea, nausea and vomiting.   Neurological: Negative for headaches.   Psychiatric/Behavioral: Positive for depression. The patient has insomnia and is nervous/anxious.        Objective:      Physical Exam   Constitutional: She appears well-developed and well-nourished.   HENT:   Head: Normocephalic.   Eyes: Pupils are equal, round, and reactive to light.   Neck: Normal range of motion.   Cardiovascular: Normal rate and regular rhythm.   Pulmonary/Chest: Effort normal and breath sounds normal.   Musculoskeletal:         Lumbar back: She exhibits no bony tenderness, no swelling, no edema, no deformity, no laceration, no spasm and normal pulse.   Neurological: She is alert.   No focal neurologic deficits   Skin: Skin is warm.   Psychiatric: Her speech is normal and behavior is normal. Judgment and thought content normal. Her mood appears anxious. She exhibits a depressed mood.   Patient appears stressed, anxious and depressed. Her conversation is clear and coherent. She is oriented to time,place and person.   Nursing note and vitals reviewed.      Assessment:       1. Anxiety in acute stress reaction    2. Occasional tremors    3. Chest pain, unspecified type    4. Abdominal pain, unspecified abdominal location    5. Depression, unspecified depression type        Plan:       Continue follow up with psychiatrist, taking antidepressants and anti anxiety medication.         Restrictions: Disabled until next office visit  Follow up in about 1 week (around 7/9/2019).

## 2019-07-11 ENCOUNTER — OFFICE VISIT (OUTPATIENT)
Dept: OBSTETRICS AND GYNECOLOGY | Facility: CLINIC | Age: 39
End: 2019-07-11
Payer: COMMERCIAL

## 2019-07-11 ENCOUNTER — OFFICE VISIT (OUTPATIENT)
Dept: URGENT CARE | Facility: CLINIC | Age: 39
End: 2019-07-11
Payer: OTHER MISCELLANEOUS

## 2019-07-11 VITALS
DIASTOLIC BLOOD PRESSURE: 70 MMHG | HEIGHT: 64 IN | SYSTOLIC BLOOD PRESSURE: 126 MMHG | WEIGHT: 178.38 LBS | BODY MASS INDEX: 30.45 KG/M2

## 2019-07-11 DIAGNOSIS — F41.1 ANXIETY IN ACUTE STRESS REACTION: Primary | ICD-10-CM

## 2019-07-11 DIAGNOSIS — R07.9 CHEST PAIN, UNSPECIFIED TYPE: ICD-10-CM

## 2019-07-11 DIAGNOSIS — R25.1 OCCASIONAL TREMORS: ICD-10-CM

## 2019-07-11 DIAGNOSIS — F43.0 ANXIETY IN ACUTE STRESS REACTION: Primary | ICD-10-CM

## 2019-07-11 DIAGNOSIS — Z01.419 ENCOUNTER FOR GYNECOLOGICAL EXAMINATION WITHOUT ABNORMAL FINDING: Primary | ICD-10-CM

## 2019-07-11 DIAGNOSIS — R10.9 ABDOMINAL PAIN, UNSPECIFIED ABDOMINAL LOCATION: ICD-10-CM

## 2019-07-11 DIAGNOSIS — F32.A DEPRESSION, UNSPECIFIED DEPRESSION TYPE: ICD-10-CM

## 2019-07-11 PROCEDURE — 99999 PR PBB SHADOW E&M-EST. PATIENT-LVL II: CPT | Mod: PBBFAC,,, | Performed by: OBSTETRICS & GYNECOLOGY

## 2019-07-11 PROCEDURE — 88175 CYTOPATH C/V AUTO FLUID REDO: CPT | Performed by: PATHOLOGY

## 2019-07-11 PROCEDURE — 88141 CYTOPATH C/V INTERPRET: CPT | Mod: ,,, | Performed by: PATHOLOGY

## 2019-07-11 PROCEDURE — 99395 PREV VISIT EST AGE 18-39: CPT | Mod: S$GLB,,, | Performed by: OBSTETRICS & GYNECOLOGY

## 2019-07-11 PROCEDURE — 99999 PR PBB SHADOW E&M-EST. PATIENT-LVL II: ICD-10-PCS | Mod: PBBFAC,,, | Performed by: OBSTETRICS & GYNECOLOGY

## 2019-07-11 PROCEDURE — 88141 LIQUID-BASED PAP SMEAR, SCREENING: ICD-10-PCS | Mod: ,,, | Performed by: PATHOLOGY

## 2019-07-11 PROCEDURE — 99214 OFFICE O/P EST MOD 30 MIN: CPT | Mod: S$GLB,,, | Performed by: PREVENTIVE MEDICINE

## 2019-07-11 PROCEDURE — 99395 PR PREVENTIVE VISIT,EST,18-39: ICD-10-PCS | Mod: S$GLB,,, | Performed by: OBSTETRICS & GYNECOLOGY

## 2019-07-11 PROCEDURE — 99214 PR OFFICE/OUTPT VISIT, EST, LEVL IV, 30-39 MIN: ICD-10-PCS | Mod: S$GLB,,, | Performed by: PREVENTIVE MEDICINE

## 2019-07-11 RX ORDER — IBUPROFEN 600 MG/1
600 TABLET ORAL EVERY 6 HOURS PRN
Qty: 30 TABLET | Refills: 0 | Status: SHIPPED | OUTPATIENT
Start: 2019-07-11

## 2019-07-11 NOTE — LETTER
[facility] [place]  [address] [place] [address]  Phone: [phone]  Fax: [phone]  [facility]: [id number]    Pt Name: Vicki Novoa  Injury Date: 06/24/2019   Employee ID: [id number] Date of Treatment: 07/11/2019   Company: [employer]      Appointment Time: 11:00 AM Arrived: 1121 AM   Provider: Javier Farnsworth MD Time Out:1305 PM     Office Treatment:     EXAM  PRESCRIPTION GIVEN  RETURN APPT MADE  DISABLED UNTIL NEXT VISIT    1. Anxiety in acute stress reaction    2. Occasional tremors    3. Chest pain, unspecified type    4. Abdominal pain, unspecified abdominal location    5. Depression, unspecified depression type      Medications Ordered This Encounter   Medications    ibuprofen (ADVIL,MOTRIN) 600 MG tablet      Patient Instructions: (Continue regular follow up with psychiatrist)      Restrictions: Disabled until next office visit     Return Appointment: 7/30/2019 at 1100
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
## 2019-07-11 NOTE — PROGRESS NOTES
-FINAL DIAGNOSTIC INTERPRETATION  Low-grade squamous intraepithelial lesion.  Endocervical component is absent.    PT HERE FOR ANNUAL.    ROS:  GENERAL: No fever, chills, fatigability or weight loss.  VULVAR: No pain, no lesions and no itching.  VAGINAL: No relaxation, no itching, no discharge, no abnormal bleeding and no lesions.  ABDOMEN: No abdominal pain. Denies nausea. Denies vomiting. No diarrhea. No constipation  BREAST: Denies pain. No lumps. No discharge.  URINARY: No incontinence, no nocturia, no frequency and no dysuria.  CARDIOVASCULAR: No chest pain. No shortness of breath. No leg cramps.  NEUROLOGICAL: No headaches. No vision changes.  The remainder of the review of systems was negative.    PE:  General Appearance: overweight And Well developed. Well nourished. In no acute distress.  Vulva: Lesions: No.  Urethral Meatus: Normal size. Normal location. No lesions. No prolapse.  Urethra: No masses. No tenderness. No prolapse. No scarring.  Bladder: No masses. No tenderness.  Vagina: Mucosa NI:yes discharge no, atrophy no, cystocele no or rectocele no.  Cervix: Lesion: no  Stenotic: no Cervical motion tenderness: no  Uterus: Uterus size: 6 weeks. Support good. Uterus size: Normal  Adnexa: Masses: No Tenderness: No CDS Nodularity: No  Abdomen: overweight No masses. No tenderness.  Breasts: No bilateral masses. No bilateral discharge. No bilateral tenderness. No bilateral fibrocystic changes.  Neck: No thyroid enlargement. No thyroid masses.  Skin: Rashes: No      PROCEDURES:    PLAN:     DIAGNOSIS:  1. Encounter for gynecological examination without abnormal finding        MEDICATIONS & ORDERS:  Orders Placed This Encounter    Liquid-based pap smear, screening       Patient was counseled today on the new ACS guidelines for cervical cytology screening as well as the current recommendations for breast cancer screening. She was counseled to follow up with her PCP for other routine health maintenance.  Counseling session lasted approximately 10 minutes, and all her questions were answered.         FOLLOW-UP: With me in 12 month

## 2019-07-23 ENCOUNTER — OFFICE VISIT (OUTPATIENT)
Dept: URGENT CARE | Facility: CLINIC | Age: 39
End: 2019-07-23
Payer: OTHER MISCELLANEOUS

## 2019-07-23 DIAGNOSIS — R07.9 CHEST PAIN, UNSPECIFIED TYPE: ICD-10-CM

## 2019-07-23 DIAGNOSIS — R10.9 ABDOMINAL PAIN, UNSPECIFIED ABDOMINAL LOCATION: ICD-10-CM

## 2019-07-23 DIAGNOSIS — R25.1 OCCASIONAL TREMORS: ICD-10-CM

## 2019-07-23 DIAGNOSIS — F41.1 ANXIETY IN ACUTE STRESS REACTION: Primary | ICD-10-CM

## 2019-07-23 DIAGNOSIS — F43.0 ANXIETY IN ACUTE STRESS REACTION: Primary | ICD-10-CM

## 2019-07-23 DIAGNOSIS — F32.A DEPRESSION, UNSPECIFIED DEPRESSION TYPE: ICD-10-CM

## 2019-07-23 PROCEDURE — 99214 OFFICE O/P EST MOD 30 MIN: CPT | Mod: S$GLB,,, | Performed by: PREVENTIVE MEDICINE

## 2019-07-23 PROCEDURE — 99214 PR OFFICE/OUTPT VISIT, EST, LEVL IV, 30-39 MIN: ICD-10-PCS | Mod: S$GLB,,, | Performed by: PREVENTIVE MEDICINE
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
## 2019-07-24 ENCOUNTER — TELEPHONE (OUTPATIENT)
Dept: OBSTETRICS AND GYNECOLOGY | Facility: CLINIC | Age: 39
End: 2019-07-24

## 2019-07-24 NOTE — TELEPHONE ENCOUNTER
----- Message from Mel Gonzalez sent at 7/24/2019 11:11 AM CDT -----  Contact: pt  Name of Who is Calling: Vicki      What is the request in detail: requesting a call back. Pt states that Mikey called her and stating that she needed to come back in. Please call to advise      Can the clinic reply by [facility]: no      What Number to Call Back if not in [facility]: [phone]

## 2019-07-29 ENCOUNTER — OFFICE VISIT (OUTPATIENT)
Dept: URGENT CARE | Facility: CLINIC | Age: 39
End: 2019-07-29
Payer: OTHER MISCELLANEOUS
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
## 2019-07-29 DIAGNOSIS — R10.9 ABDOMINAL PAIN, UNSPECIFIED ABDOMINAL LOCATION: ICD-10-CM

## 2019-07-29 DIAGNOSIS — F32.A DEPRESSION, UNSPECIFIED DEPRESSION TYPE: ICD-10-CM

## 2019-07-29 DIAGNOSIS — R07.9 CHEST PAIN, UNSPECIFIED TYPE: ICD-10-CM

## 2019-07-29 DIAGNOSIS — F43.0 ANXIETY IN ACUTE STRESS REACTION: Primary | ICD-10-CM

## 2019-07-29 DIAGNOSIS — F41.1 ANXIETY IN ACUTE STRESS REACTION: Primary | ICD-10-CM

## 2019-07-29 DIAGNOSIS — R25.1 OCCASIONAL TREMORS: ICD-10-CM

## 2019-07-29 PROCEDURE — 99214 OFFICE O/P EST MOD 30 MIN: CPT | Mod: S$GLB,,, | Performed by: PREVENTIVE MEDICINE

## 2019-07-29 PROCEDURE — 99214 PR OFFICE/OUTPT VISIT, EST, LEVL IV, 30-39 MIN: ICD-10-PCS | Mod: S$GLB,,, | Performed by: PREVENTIVE MEDICINE

## 2019-07-29 NOTE — LETTER
[facility] [place]  [address] [facility], [address]  [place] [address]  Phone: [phone]  Fax: [phone]  [facility]: [id number]    Pt Name: Vicki Novoa  Injury Date: 06/24/2019   Employee ID:  Date of  Treatment: 07/29/2019   Company: [employer] reference to record [employer]      Appointment Time: 03:45 PM Arrived: 3:45pm   Provider: Javier Farnsworth MD Time Out:5:15pm     Office Treatment:     1. Anxiety in acute stress reaction    2. Depression, unspecified depression type    3. Chest pain, unspecified type    4. Occasional tremors    5. Abdominal pain, unspecified abdominal location    6. Work related injury          Patient Instructions: (Follow up with treatment with psychiatrist. )    Restrictions: Disabled until next office visit(patient will remain unable to return to her work environment as long as she has to interact with this supervisor. )     Return Appointment: 8/12/2019 at 3:00pm  LN
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
## 2019-08-14 ENCOUNTER — OFFICE VISIT (OUTPATIENT)
Dept: URGENT CARE | Facility: CLINIC | Age: 39
End: 2019-08-14
Payer: OTHER MISCELLANEOUS

## 2019-08-14 DIAGNOSIS — R10.9 ABDOMINAL PAIN, UNSPECIFIED ABDOMINAL LOCATION: ICD-10-CM

## 2019-08-14 DIAGNOSIS — R07.9 CHEST PAIN, UNSPECIFIED TYPE: ICD-10-CM

## 2019-08-14 DIAGNOSIS — F43.0 ANXIETY IN ACUTE STRESS REACTION: Primary | ICD-10-CM

## 2019-08-14 DIAGNOSIS — F41.1 ANXIETY IN ACUTE STRESS REACTION: Primary | ICD-10-CM

## 2019-08-14 DIAGNOSIS — F32.A DEPRESSION, UNSPECIFIED DEPRESSION TYPE: ICD-10-CM
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
## 2019-08-14 DIAGNOSIS — R25.1 OCCASIONAL TREMORS: ICD-10-CM

## 2019-08-14 PROCEDURE — 99214 OFFICE O/P EST MOD 30 MIN: CPT | Mod: S$GLB,,, | Performed by: PREVENTIVE MEDICINE

## 2019-08-14 PROCEDURE — 99214 PR OFFICE/OUTPT VISIT, EST, LEVL IV, 30-39 MIN: ICD-10-PCS | Mod: S$GLB,,, | Performed by: PREVENTIVE MEDICINE
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
## 2019-08-14 NOTE — LETTER
[facility] [place]  [address] [facility], [address]  [place] [address]  Phone: [phone]  Fax: [phone]  [facility]: [id number]    Pt Name: Vicki Novoa  Injury Date: 06/24/2019   Employee ID: [id number] Date of First Treatment: 08/14/2019   Company: [employer]      Appointment Time: 04:00 PM Arrived: 4:03pm   Provider: Javier Farnsworth MD Time Out:4:50pm     Office Treatment:   1. Anxiety in acute stress reaction    2. Depression, unspecified depression type    3. Chest pain, unspecified type    4. Occasional tremors    5. Abdominal pain, unspecified abdominal location    6. Work related injury          Patient Instructions: (Continue follow-up treatment with psychiatrist.  Now taking Lexapro for anxiety and depression.)    Restrictions: Disabled until next office visit(Patient may not return to her regular work environment as long as she has to interact with her supervisor.)     Return Appointment: 9/4/2019 at 3:30pm  LN